# Patient Record
Sex: FEMALE | Race: BLACK OR AFRICAN AMERICAN | RURAL
[De-identification: names, ages, dates, MRNs, and addresses within clinical notes are randomized per-mention and may not be internally consistent; named-entity substitution may affect disease eponyms.]

---

## 2017-08-24 ENCOUNTER — OFFICE VISIT (OUTPATIENT)
Dept: FAMILY MEDICINE CLINIC | Age: 32
End: 2017-08-24

## 2017-08-24 VITALS
HEART RATE: 98 BPM | HEIGHT: 63 IN | DIASTOLIC BLOOD PRESSURE: 82 MMHG | WEIGHT: 182 LBS | OXYGEN SATURATION: 98 % | TEMPERATURE: 98.6 F | BODY MASS INDEX: 32.25 KG/M2 | SYSTOLIC BLOOD PRESSURE: 120 MMHG | RESPIRATION RATE: 16 BRPM

## 2017-08-24 DIAGNOSIS — H66.001 ACUTE SUPPURATIVE OTITIS MEDIA OF RIGHT EAR WITHOUT SPONTANEOUS RUPTURE OF TYMPANIC MEMBRANE, RECURRENCE NOT SPECIFIED: Primary | ICD-10-CM

## 2017-08-24 DIAGNOSIS — J02.9 PHARYNGITIS, UNSPECIFIED ETIOLOGY: ICD-10-CM

## 2017-08-24 DIAGNOSIS — H60.391 OTHER INFECTIVE OTITIS EXTERNA OF RIGHT EAR, UNSPECIFIED CHRONICITY: ICD-10-CM

## 2017-08-24 RX ORDER — TOBRAMYCIN 3 MG/ML
3 SOLUTION/ DROPS OPHTHALMIC 3 TIMES DAILY
Qty: 1 BOTTLE | Refills: 0 | Status: SHIPPED | OUTPATIENT
Start: 2017-08-24 | End: 2017-09-03

## 2017-08-24 RX ORDER — AZITHROMYCIN 250 MG/1
TABLET, FILM COATED ORAL
Qty: 6 TAB | Refills: 0 | Status: SHIPPED | OUTPATIENT
Start: 2017-08-24 | End: 2017-08-29

## 2017-08-24 NOTE — MR AVS SNAPSHOT
Visit Information Date & Time Provider Department Dept. Phone Encounter #  
 8/24/2017  8:00 AM Mary Jo Grigsby MD  [facility] [place] [id number] Follow-up Instructions Return in about 1 week (around 8/31/2017) for Pap Testing. Upcoming Health Maintenance Date Due Pneumococcal 19-64 Medium Risk (1 of 1 - PPSV23) 10/20/2005 DTaP/Tdap/Td series (1 - Tdap) 10/20/2007 PAP AKA CERVICAL CYTOLOGY 6/13/2017 INFLUENZA AGE 9 TO ADULT 8/1/2017 Allergies as of 8/24/2017  Review Complete On: 7/21/2015 By: Fadia Carver Severity Noted Reaction Type Reactions Amoxicillin High 08/24/2017    Hives, Swelling Voltaren [Diclofenac Sodium]  07/20/2015    Rash Current Immunizations  Never Reviewed No immunizations on file. Not reviewed this visit You Were Diagnosed With   
  
 Codes Comments Otitis interna, right    -  Primary ICD-10-CM: H83.01 
ICD-9-CM: 386.30 Other infective otitis externa of right ear, unspecified chronicity     ICD-10-CM: N98.507 ICD-9-CM: 380.10 Pharyngitis, unspecified etiology     ICD-10-CM: J02.9 ICD-9-CM: 886 Vitals BP Pulse Temp Resp Height(growth percentile) Weight(growth percentile) 120/82 (BP 1 Location: Right arm, BP Patient Position: Sitting) 98 98.6 °F (37 °C) (Oral) 16 5' 2.99\" (1.6 m) 182 lb (82.6 kg) SpO2 BMI OB Status Smoking Status 98% 32.25 kg/m2 IUD Current Every Day Smoker BMI and BSA Data Body Mass Index Body Surface Area  
 32.25 kg/m 2 1.92 m 2 Preferred Pharmacy Pharmacy Name Phone [address] [address] [phone] Your Updated Medication List  
  
   
This list is accurate as of: 8/24/17  9:15 AM.  Always use your most recent med list.  
  
  
  
  
 azithromycin 250 mg tablet Commonly known as:  Kane Ship Take 2 tablets today, then take 1 tablet daily diclofenac EC 75 mg EC tablet Commonly known as:  VOLTAREN Take 1 Tab by mouth two (2) times a day. NEXPLANON 68 mg Impl Generic drug:  etonogestrel  
by SubDERmal route. tobramycin 0.3 % ophthalmic solution Commonly known as:  Vu Talley Administer 3 Drops in right ear three (3) times daily for 10 days. Prescriptions Sent to Pharmacy Refills  
 azithromycin (ZITHROMAX) 250 mg tablet 0 Sig: Take 2 tablets today, then take 1 tablet daily Class: Normal  
 Pharmacy: [address] [place] [address] #: [phone]  
 tobramycin (TOBREX) 0.3 % ophthalmic solution 0 Sig: Administer 3 Drops in right ear three (3) times daily for 10 days. Class: Normal  
 Pharmacy: [address] [place] [address] #: [phone] Route: Right Ear Follow-up Instructions Return in about 1 week (around 8/31/2017) for Pap Testing. Introducing [facility]! Ariela Quiroz introduces [employer] patient portal. Now you can access parts of your medical record, email your doctor's office, and request medication refills online. 1. In your internet browser, go to [email or web address]/[facility] 2. Click on the First Time User? Click Here link in the Sign In box. You will see the New Member Sign Up page. 3. Enter your [employer] Access Code exactly as it appears below. You will not need to use this code after youve completed the sign-up process. If you do not sign up before the expiration date, you must request a new code. · [employer] Access Code: [email or web address] Expires: 11/22/2017  8:06 AM 
 
4. Enter the last four digits of your Social Security Number (xxxx) and Date of Birth (mm/dd/yyyy) as indicated and click Submit. You will be taken to the next sign-up page. 5. Create a [employer] ID. This will be your [employer] login ID and cannot be changed, so think of one that is secure and easy to remember. 6. Create a [employer] password. You can change your password at any time. 7. Enter your Password Reset Question and Answer. This can be used at a later time if you forget your password. 8. Enter your e-mail address. You will receive e-mail notification when new information is available in [address] 9. Click Sign Up. You can now view and download portions of your medical record. 10. Click the Download Summary menu link to download a portable copy of your medical information. If you have questions, please visit the Frequently Asked Questions section of the [employer] website. Remember, [employer] is NOT to be used for urgent needs. For medical emergencies, dial 911. Now available from your iPhone and Android! Please provide this summary of care documentation to your next provider. Your primary care clinician is listed as Hemant Willis. If you have any questions after today's visit, please call [phone].

## 2017-08-24 NOTE — PROGRESS NOTES
[facility]    Subjective:   Eliana Turner is a 28 y.o. female with history of iron deficiency Anemia  CC: Right ear pain  History provided by patient and Records    HPI:  Patient seen at [facility] in July and told she had Otits Externa of the right ear, given Otic steroid drops. Denies any improvment of pain with original regimen. Describes persistent 6-8/10 throbbing/aching pain of the right ear that is deep, in the [place], around the ear, and down the right side of the neck. Denies swelling or difficulty swallowing. Pain worse at night and the morning and noting some discharge. Pain exacerbated with manipulation of the ear. Also notes some decreased hearing of the right ear. Denies fevers, chills, nausea/vomiting, diarrhea, neck stiffness, fatigue. Also noting 10 days of sore throat, exacerbated with swallowing. Denies cough. PFSH: Step daughter recently diagnosed with strep throat. Patient is current daily smoker    No current outpatient prescriptions on file prior to visit. No current facility-administered medications on file prior to visit. Patient Active Problem List   Diagnosis Code    Iron deficiency anemia D50.9       Social History     Social History    Marital status: SINGLE     Spouse name: N/A    Number of children: N/A    Years of education: N/A     Occupational History    Not on file. Social History Main Topics    Smoking status: Current Every Day Smoker    Smokeless tobacco: Not on file    Alcohol use Yes    Drug use: Not on file    Sexual activity: Yes     Partners: Male     Birth control/ protection: Implant     Other Topics Concern    Not on file     Social History Narrative       Review of Systems   Constitutional: Negative for chills, fever and malaise/fatigue. HENT: Positive for ear discharge, ear pain, hearing loss and sore throat. Negative for congestion, nosebleeds and sinus pain.     Eyes: Negative for blurred vision. Respiratory: Negative for cough and sputum production. Cardiovascular: Negative for chest pain. Gastrointestinal: Negative for abdominal pain, diarrhea, nausea and vomiting. Musculoskeletal: Positive for neck pain. Negative for myalgias. Skin: Negative for rash. Neurological: Negative for dizziness and headaches. Endo/Heme/Allergies: Negative for environmental allergies. Objective:     Visit Vitals    /82 (BP 1 Location: Right arm, BP Patient Position: Sitting)    Pulse 98    Temp 98.6 °F (37 °C) (Oral)    Resp 16    Ht 5' 2.99\" (1.6 m)    Wt 182 lb (82.6 kg)    SpO2 98%    BMI 32.25 kg/m2        Physical Exam   Constitutional: She appears well-developed and well-nourished. No distress. HENT:   Head: Normocephalic and atraumatic. Left Ear: Tympanic membrane and ear canal normal.   Nose: Nose normal.   Mouth/Throat: Mucous membranes are normal. Normal dentition. No dental abscesses or dental caries. Posterior oropharyngeal erythema present. No oropharyngeal exudate. No pain on palpation of temporal artery. Right ear with erythema of the canal and gray/offwhite discharge present. Negative light reflex of TM with effusion present, not ruptured. Tenderness with manipulation of the ear. Eyes: Conjunctivae are normal.   Neck: Normal range of motion. Neck supple. Cardiovascular: Normal rate, regular rhythm, normal heart sounds and intact distal pulses. No murmur heard. Pulmonary/Chest: Effort normal and breath sounds normal. No respiratory distress. Abdominal: Soft. Bowel sounds are normal.   Lymphadenopathy:     She has no cervical adenopathy. Skin: No rash noted. Nursing note and vitals reviewed. Pertinent Labs/Studies:  POC Strep negative    Assessment and orders:       ICD-10-CM ICD-9-CM    1.  Acute suppurative otitis media of right ear without spontaneous rupture of tympanic membrane, recurrence not specified H66.001 382.00 azithromycin (ZITHROMAX) 250 mg tablet   2. Other infective otitis externa of right ear, unspecified chronicity H60.391 380.10 tobramycin (TOBREX) 0.3 % ophthalmic solution   3. Pharyngitis, unspecified etiology J02.9 462 AMB POC RAPID STREP A     Diagnoses and all orders for this visit:    1. Otitis media, right: Negative light reflex, presence of effusion, and pain with hearing loss. Given Penicillin allergy will start second-line treatment that can also cover for possible strep pharyngitis. -     azithromycin (ZITHROMAX) 250 mg tablet; Take 2 tablets today, then take 1 tablet daily    2. Other infective otitis externa of right ear, unspecified chronicity: Will use Tobramycin drops. With buffer, decrease irritation present. -     tobramycin (TOBREX) 0.3 % ophthalmic solution; Administer 3 Drops in right ear three (3) times daily for 10 days. 3. Pharyngitis, unspecified etiology: Though POC strep negative, history is concerning for Strep pharyngitis. Given Penicillin allergy, treat with Azithromycin course  -     AMB POC RAPID STREP A      Follow-up Disposition:  Return in about 1 week (around 8/31/2017) for Pap Testing. I have reviewed patient medical and social history and medications. I have reviewed pertinent labs results and other data. I have discussed the diagnosis with the patient and the intended plan as seen in the above orders. The patient has received an after-visit summary and questions were answered concerning future plans. I have discussed medication side effects and warnings with the patient as well.     Gaye Goldsmith MD  Resident [facility]  08/24/17    Patient Discussed with Dr. Guido Cassidy, Attending Physician

## 2017-08-24 NOTE — PROGRESS NOTES
Chief Complaint   Patient presents with    Ear Pain     R ear pain; consistant throbbing with discharge; has effected hearing; pain is worst during the morning and right before bed.  Medications only ressult in [place] relief     Health Maintenance Due   Topic Date Due    Pneumococcal 19-64 Medium Risk (1 of 1 - PPSV23) 10/20/2005    DTaP/Tdap/Td series (1 - Tdap) 10/20/2007    PAP AKA CERVICAL CYTOLOGY  06/13/2017    INFLUENZA AGE 9 TO ADULT  08/01/2017

## 2017-08-25 NOTE — PROGRESS NOTES
I discussed the findings, assessment and plan with the resident and agree with the resident's findings and plan as documented in the resident's note. Yong Serra M.D.

## 2019-03-05 ENCOUNTER — OFFICE VISIT (OUTPATIENT)
Dept: FAMILY MEDICINE CLINIC | Age: 34
End: 2019-03-05

## 2019-03-05 VITALS
RESPIRATION RATE: 16 BRPM | HEART RATE: 88 BPM | OXYGEN SATURATION: 100 % | WEIGHT: 186.2 LBS | BODY MASS INDEX: 32.99 KG/M2 | SYSTOLIC BLOOD PRESSURE: 126 MMHG | DIASTOLIC BLOOD PRESSURE: 85 MMHG | HEIGHT: 63 IN | TEMPERATURE: 98.3 F

## 2019-03-05 DIAGNOSIS — J02.9 PHARYNGITIS, UNSPECIFIED ETIOLOGY: Primary | ICD-10-CM

## 2019-03-05 RX ORDER — AZITHROMYCIN 250 MG/1
TABLET, FILM COATED ORAL
Qty: 6 TAB | Refills: 0 | Status: SHIPPED | OUTPATIENT
Start: 2019-03-05 | End: 2019-03-10

## 2019-03-05 NOTE — PROGRESS NOTES
1. Have you been to the ER, urgent care clinic since your last visit? Hospitalized since your last visit? Yes When: [facility] for sore throat     2. Have you seen or consulted any other health care providers outside of the [address] [place] [address] since your last visit? Include any pap smears or colon screening.  No  Reviewed record in preparation for visit and have necessary documentation  Pt did not bring medication to office visit for review  Information was given to pt on Advanced Directives, Living Will  Information was given on Shingles Vaccine  opportunity was given for questions  Goals that were addressed and/or need to be completed during or after this appointment include     Health Maintenance Due   Topic Date Due    Pneumococcal 19-64 Medium Risk (1 of 1 - PPSV23) 10/20/2005    DTaP/Tdap/Td series (1 - Tdap) 10/20/2007    PAP AKA CERVICAL CYTOLOGY  06/13/2017    Influenza Age 9 to Adult  08/01/2018

## 2019-03-05 NOTE — PROGRESS NOTES
[facility]    Subjective:   Bennie Davies is a 29 y.o. female with history of iron deficiency anemia  CC: Sore throat, concern for Pharyngitis  History provided by patient and Records    HPI:  Patient concerned for Pharyngitis. Starting 4 days ago developed sore throat with PO intake. Noting white material in back of throat. Noting some pressure in the ears initially that has resolved. Denies fevers, chills, myalgias, nausea/vomiting. Patient did not switch out Tooth brushes. January 22nd at [facility] where started on Keflex and Prednisone for Pharyngitis, noting a negative Strep test then. Following this followed up in the second week of February and started on Azithromycin with resolution of symptoms. Patient has 6 y.o. Without symptoms. Patient works at [employer]. PFSH:     No current outpatient medications on file prior to visit. No current facility-administered medications on file prior to visit. Patient Active Problem List   Diagnosis Code    Iron deficiency anemia D50.9       Social History     Socioeconomic History    Marital status: SINGLE     Spouse name: Not on file    Number of children: Not on file    Years of education: Not on file    Highest education level: Not on file   Social Needs    Financial resource strain: Not on file    Food insecurity - worry: Not on file    Food insecurity - inability: Not on file    Transportation needs - medical: Not on file   SPARQ needs - non-medical: Not on file   Occupational History    Not on file   Tobacco Use    Smoking status: Current Every Day Smoker    Smokeless tobacco: Never Used   Substance and Sexual Activity    Alcohol use:  Yes    Drug use: No    Sexual activity: Yes     Partners: Male     Birth control/protection: Implant   Other Topics Concern    Not on file   Social History Narrative    Not on file       Review of Systems   Constitutional: Positive for malaise/fatigue. Negative for chills and fever. HENT: Positive for sore throat. Respiratory: Positive for cough. Gastrointestinal: Negative for nausea and vomiting. Musculoskeletal: Negative for myalgias. Objective:     Visit Vitals  /85 (BP 1 Location: Left arm, BP Patient Position: Sitting)   Pulse 88   Temp 98.3 °F (36.8 °C) (Oral)   Resp 16   Ht 5' 2.99\" (1.6 m)   Wt 186 lb 3.2 oz (84.5 kg)   SpO2 100%   BMI 32.99 kg/m²          Physical Exam   Constitutional: She appears well-developed and well-nourished. No distress. HENT:   Head: Normocephalic and atraumatic. Mouth/Throat: Oropharyngeal exudate and posterior oropharyngeal erythema present. Neck: Normal range of motion. Neck supple. Cardiovascular: Normal rate, regular rhythm, normal heart sounds and intact distal pulses. Exam reveals no gallop and no friction rub. No murmur heard. Pulmonary/Chest: Effort normal and breath sounds normal.   Abdominal: Soft. Bowel sounds are normal.   Lymphadenopathy:     She has no cervical adenopathy. Nursing note and vitals reviewed. Pertinent Labs/Studies:      Assessment and orders:       ICD-10-CM ICD-9-CM    1. Pharyngitis, unspecified etiology J02.9 462 azithromycin (ZITHROMAX) 250 mg tablet      UPPER RESPIRATORY CULTURE     Diagnoses and all orders for this visit:    1. Pharyngitis, unspecified etiology: Patient with symptoms of Pharyngitis and likely source for re-infection from previously. Will trial Azithromycin at this time, changing out tooth brushes. If not effective may be viral, if improves then return will need to get son tested. -     azithromycin (ZITHROMAX) 250 mg tablet; Take 2 tablets today, then take 1 tablet daily  -     UPPER RESPIRATORY CULTURE      Follow-up Disposition:  Return if symptoms worsen or fail to improve. I have discussed the diagnosis with the patient and the intended plan as seen in the above orders.   Social history, medical history, and labs were reviewed. The patient has received an after-visit summary and questions were answered concerning future plans. I have discussed medication side effects and warnings with the patient as well.     Virgie Maxwell MD  Resident [facility]  03/05/19    Patient discussed with Dr. Dave Hoover, Attending Physician

## 2019-03-07 LAB — BACTERIA SPEC RESP CULT: NORMAL

## 2019-03-15 ENCOUNTER — TELEPHONE (OUTPATIENT)
Dept: FAMILY MEDICINE CLINIC | Age: 34
End: 2019-03-15

## 2019-03-15 NOTE — TELEPHONE ENCOUNTER
Informed pt that Dr Alexi Wong out of office  Will send request for results   She verbalized understanding

## 2019-03-28 NOTE — TELEPHONE ENCOUNTER
Returned call and discussed lab results. Negative cultures. Patient denies any persistent pharyngitis.     Bj Marks MD  Resident [facility]  03/28/19

## 2021-03-23 ENCOUNTER — VIRTUAL VISIT (OUTPATIENT)
Dept: FAMILY MEDICINE CLINIC | Age: 36
End: 2021-03-23
Payer: COMMERCIAL

## 2021-03-23 DIAGNOSIS — M25.9 WRIST LESION: ICD-10-CM

## 2021-03-23 DIAGNOSIS — U07.1 COVID-19: ICD-10-CM

## 2021-03-23 DIAGNOSIS — I10 ESSENTIAL HYPERTENSION: Primary | ICD-10-CM

## 2021-03-23 PROCEDURE — 99214 OFFICE O/P EST MOD 30 MIN: CPT | Performed by: FAMILY MEDICINE

## 2021-03-23 RX ORDER — LISINOPRIL 5 MG/1
5 TABLET ORAL DAILY
Qty: 60 TAB | Refills: 1 | Status: SHIPPED | OUTPATIENT
Start: 2021-03-23

## 2021-03-23 NOTE — PROGRESS NOTES
Denver Richer is a 30 y.o. female evaluated via Doximetry on 21. Patient Identity confirmed by . Consent:  He and/or health care decision maker is aware that that he may receive a bill for this telephone service, depending on his insurance coverage, and has provided verbal consent to proceed: Yes    Physician Location: Office  Patient Location: Home  Nurse Assisting with Encounter: Hugo Shipman LPN    Chief Complaint   Patient presents with    Cyst     left    Blood Pressure Check      Information gathered from patient and/or health care decision maker. HPI:   [id number]: Patient diagnosed yesterday with COVID-19 and is having body aches, and fatigue, and loss of smell and taste. Has not been using Tylenol at this time. Elevated Blood pressure: Patient reports that she was told she had elevated Blood pressures, particularly in the diastolic range. Elevated to the  range, though systolic in the 341-031'B. Notes that with elevated blood pressure will get headaches, generally 3-4 days of the week. Has been ongoing for several months. Painful Cyst: Present on the left wrist, painful for the last 2 weeks. Review of Systems   Constitutional: Positive for malaise/fatigue. Negative for chills and fever. HENT: Negative for congestion. Respiratory: Negative for cough and hemoptysis. Gastrointestinal: Negative for nausea and vomiting. Musculoskeletal: Positive for myalgias. Negative for neck pain. Neurological: Negative for dizziness and headaches. Limited Exam:  Due to this being a TeleHealth evaluation, many elements of the physical examination are unable to be assessed.       Constitutional: Appears well-developed and well-nourished in no apparent distress   Mental status: Alert and awake, Oriented to person/place/time, Able to follow commands  Eyes: EOM normal, Sclera normal, No visible ocular discharge  HENT: Normocephalic, atraumatic; Mouth/Throat: Moist mucous membranes, External Ears normal  Neck: No visualized mass  Pulmonary/Chest: Respiratory effort normal, No visualized signs of difficulty breathing or respiratory distress   Musculoskeletal: Normal gait with no signs of ataxia, Normal range of motion of neck  Neurological: No facial asymmetry (Cranial nerve 7 motor function), No gaze palsy  Skin: No significant exanthematous lesions or discoloration noted on facial skin  Psychiatric: Normal affect, normal judgment/insight. No hallucinations     No current outpatient medications on file prior to visit. No current facility-administered medications on file prior to visit. Allergies   Allergen Reactions    Amoxicillin Hives and Swelling    Voltaren [Diclofenac Sodium] Rash        Patient Active Problem List    Diagnosis Date Noted    Iron deficiency anemia 07/28/2015        Health Maintenance Due   Topic Date Due    Hepatitis C Screening  Never done    Pneumococcal 0-64 years (1 of 1 - PPSV23) Never done    COVID-19 Vaccine (1) Never done    DTaP/Tdap/Td series (1 - Tdap) Never done    PAP AKA CERVICAL CYTOLOGY  06/13/2017    Flu Vaccine (1) Never done        Assessment/Plan:  Details of this discussion including any medical advice provided: Starting on Lisinopril now. Discussed symptomatic therapies for COVID 19 and wrist.  If wrist not improving consider Steroid and possible topical as well. ICD-10-CM ICD-9-CM    1. Essential hypertension  I10 401.9 lisinopriL (PRINIVIL, ZESTRIL) 5 mg tablet   2. COVID-19  U07.1 079.89    3. Wrist lesion  M25.9 719.93            Total Time: minutes: 11-20 minutes was spent on telemedicine encounter discussing above problems and plans. Patient Problem list, medications, and Allergies were reviewed during this encounter.     Pursuant to the emergency declaration under the [facility], [address] [place] [address] authority and the [place] [employer] and [facility] Act, this Telephone Visit was conducted, with patient's consent, to reduce the patient's risk of exposure to COVID-19 and provide continuity of care for an established patient. I affirm this is a Patient Initiated Episode with an Established Patient who has not had a related appointment within my department in the past 7 days or scheduled within the next 24 hours. Discussed diagnoses in detail with patient. Medication risks/benefits/side effects discussed with patient. All of the patient's questions were addressed. The patient understands and agrees with our plan of care. The patient knows to call back if they are unsure of or forget any changes we discussed today or if the symptoms change.     Note: not billable if this call serves to triage the patient into an appointment for the relevant concern    MD [facility]  03/23/21

## 2021-03-23 NOTE — PROGRESS NOTES
Chief Complaint   Patient presents with    Cyst     left    Blood Pressure Check     1. Have you been to the ER, urgent care clinic since your last visit? Hospitalized since your last visit? No    2. Have you seen or consulted any other health care providers outside of the [address] [place] [address] since your last visit? Include any pap smears or colon screening.  No    Reviewed record in preparation for visit and have necessary documentation  Pt did not bring medication to office visit for review  opportunity was given for questions  Goals that were addressed and/or need to be completed during or after this appointment include   Health Maintenance Due   Topic Date Due    Hepatitis C Screening  Never done    Pneumococcal 0-64 years (1 of 1 - PPSV23) Never done    DTaP/Tdap/Td series (1 - Tdap) Never done    PAP AKA CERVICAL CYTOLOGY  06/13/2017    Flu Vaccine (1) Never done

## 2021-03-29 ENCOUNTER — TELEPHONE (OUTPATIENT)
Dept: FAMILY MEDICINE CLINIC | Age: 36
End: 2021-03-29

## 2021-03-29 RX ORDER — PREDNISONE 20 MG/1
20 TABLET ORAL
Qty: 5 TAB | Refills: 0 | Status: SHIPPED | OUTPATIENT
Start: 2021-03-29 | End: 2021-04-03

## 2021-03-29 NOTE — TELEPHONE ENCOUNTER
Prednisone called in for painful cyst.    MD [facility]  03/29/21

## 2021-03-29 NOTE — TELEPHONE ENCOUNTER
Pt states that Dr Emma Adair told pt to call back and have nurse send over an RX prednisone. Pt states that the cyst is not going down. Getting bigger and more painful.  Please send to [facility]